# Patient Record
Sex: MALE | Race: BLACK OR AFRICAN AMERICAN | Employment: UNEMPLOYED | URBAN - METROPOLITAN AREA
[De-identification: names, ages, dates, MRNs, and addresses within clinical notes are randomized per-mention and may not be internally consistent; named-entity substitution may affect disease eponyms.]

---

## 2022-06-13 ENCOUNTER — APPOINTMENT (OUTPATIENT)
Dept: GENERAL RADIOLOGY | Age: 27
End: 2022-06-13

## 2022-06-13 ENCOUNTER — HOSPITAL ENCOUNTER (EMERGENCY)
Age: 27
Discharge: HOME | End: 2022-06-13

## 2022-06-13 VITALS
OXYGEN SATURATION: 95 % | HEART RATE: 68 BPM | HEIGHT: 74 IN | TEMPERATURE: 98.3 F | SYSTOLIC BLOOD PRESSURE: 114 MMHG | RESPIRATION RATE: 16 BRPM | WEIGHT: 183 LBS | DIASTOLIC BLOOD PRESSURE: 84 MMHG | BODY MASS INDEX: 23.49 KG/M2

## 2022-06-13 DIAGNOSIS — L60.8 TOENAIL DEFORMITY: ICD-10-CM

## 2022-06-13 DIAGNOSIS — M79.675 GREAT TOE PAIN, LEFT: Primary | ICD-10-CM

## 2022-06-13 PROCEDURE — 73660 X-RAY EXAM OF TOE(S): CPT

## 2022-06-13 PROCEDURE — 99283 EMERGENCY DEPT VISIT LOW MDM: CPT

## 2022-06-13 ASSESSMENT — PAIN SCALES - GENERAL: PAINLEVEL_OUTOF10: 6

## 2022-06-13 NOTE — ED NOTES
Discharge education reviewed. Questions answered. Medications clarified. Belongings gathered. Discharge instructions signed. All belongings accounted for. Patient discharged to home via POV.       Baljeet Littlejohn RN  06/13/22 9613

## 2022-06-13 NOTE — ED PROVIDER NOTES
eMERGENCY dEPARTMENT eNCOUnter        [address] [facility]    PCP: No primary care provider on file. CHIEF COMPLAINT    Chief Complaint   Patient presents with    Toe Pain     left big toe          HPI    Earlene Villafuerte is a 24 y.o. male who presents with pain great toe pain. Onset is acute on chronic. Context is patient states he initially injured the left great toe 12 years ago while [place]. He denies any new trauma or injury however does state intermittently he will have dirt and debris under the toenail which she will clean out. 10 days ago, he was trying to clean out the underside of the toe when he began developing bleeding under the nail plate. He states since then, he has had dried dark blood and discoloration to the great toenail but no active bleeding. He has had some pain along the lateral nail fold but no redness or warmth at this time. He is not a diabetic. No history of IV drug use. No fever or chills. Pain is 6/10 throbbing ache that worsens with palpation and ambulation. No other similar complaints other toes of the left foot. REVIEW OF SYSTEMS    General: No fever or chills  Skin: See HPI  Musculoskeletal: No other joint pain    All other review of systems are negative  See HPI and nursing notes for additional information       PAST MEDICAL & SURGICAL HISTORY    No past medical history on file. No past surgical history on file.     CURRENT MEDICATIONS        ALLERGIES    Not on File    SOCIAL & FAMILY HISTORY    Social History     Socioeconomic History    Marital status: Single     Spouse name: Not on file    Number of children: Not on file    Years of education: Not on file    Highest education level: Not on file   Occupational History    Not on file   Tobacco Use    Smoking status: Not on file    Smokeless tobacco: Not on file   Substance and Sexual Activity    Alcohol use: Not on file    Drug use: Not on file    Sexual activity: Not on file   Other Topics Concern    Not on file   Social History Narrative    Not on file     Social Determinants of Health     Financial Resource Strain:     Difficulty of Paying Living Expenses: Not on file   Food Insecurity:     Worried About Running Out of Food in the Last Year: Not on file    [place]ette of Food in the Last Year: Not on file   Transportation Needs:     Lack of Transportation (Medical): Not on file    Lack of Transportation (Non-Medical): Not on file   Physical Activity:     Days of Exercise per Week: Not on file    Minutes of Exercise per Session: Not on file   Stress:     Feeling of Stress : Not on file   Social Connections:     Frequency of Communication with Friends and Family: Not on file    Frequency of Social Gatherings with Friends and Family: Not on file    Attends Buddhist Services: Not on file    Active Member of [address] [place] [address] [employer] or Organizations: Not on file    Attends Club or Organization Meetings: Not on file    Marital Status: Not on file   Intimate Partner Violence:     Fear of Current or Ex-Partner: Not on file    Emotionally Abused: Not on file    Physically Abused: Not on file    Sexually Abused: Not on file   Housing Stability:     Unable to Pay for Housing in the Last Year: Not on file    Number of [place] in the Last Year: Not on file    Unstable Housing in the Last Year: Not on file     No family history on file. PHYSICAL EXAM    VITAL SIGNS: /84   Pulse 68   Temp 98.3 °F (36.8 °C) (Oral)   Resp 16   Ht 6' 2\" (1.88 m)   Wt 183 lb (83 kg)   SpO2 95%   BMI 23.50 kg/m²   Constitutional:  Well developed, well nourished. Appears comfortable  HENT:  Atraumatic, normocephalic, PERRL, EOMIs, nasal bones midline, trachea midline  Respiratory:  No retractions, no respiratory distress  Musculoskeletal:   Focused exam of the left great toe reveals no gross bony deformities.   Patient does have an abnormally shaped thickened left toenail with lifting of the distal nail plate with visible dried blood under the nail plate. No erythema swelling or fluctuance of the proximal nail fold or lateral nail folds. Pad of the toe is soft and nontender. Distal nail plate has been cut very short. No tenderness warmth or edema of the IP or MTP joints. No proximal streaking onto the foot dorsum. The ankle joint is stable with intact active ROM. Achilles tendon is clinical intact. Compartments are soft throughout the left lower extremity. No swelling, discoloration, or tenderness to palpation of the proximal to distal lower leg bones, ankle & other toes  Distal capillary refill, sensation, motor intact. Vascular:  DP pulse 2+, capillary refill intact. Integument:  No open wounds of the affected limb  Neurologic:  Awake alert, no slurred speech. No foot drop of the affected extremity. DTRs and distal sensation equal/intact. RADIOLOGY           XR TOE LEFT (MIN 2 VIEWS) (Final result)  Result time 06/13/22 15:52:25  Final result by Cedric Ba MD (06/13/22 15:52:25)                Impression:    No acute or subacute abnormality identified within the left great toe. If symptoms persist, consider further evaluation with nonemergent MRI. Narrative:    EXAMINATION:   3 XRAY VIEWS OF THE LEFT TOE     6/13/2022 12:37 pm     COMPARISON:   None.      HISTORY:   ORDERING SYSTEM PROVIDED HISTORY: left great toe pain   TECHNOLOGIST PROVIDED HISTORY:   Reason for exam:->left great toe pain   Reason for Exam: hurt 1st toe while playing basket ball x10 days ago     FINDINGS:   3 images of the left great toe were obtained.  No acute fracture or   dislocation is identified within the left great toe.  There is suggestion of   mild hallux valgus metatarsus varus primus on these nonweightbearing images.                           ED COURSE & MEDICAL DECISION MAKING       Vital signs and nursing notes reviewed during ED course. I have independently evaluated this patient . Supervising MD - Dr. Jaleesa Torres - present in the Emergency Department, available for consultation, throughout entirety of  patient care. All pertinent Lab data and radiographic results reviewed with patient at bedside. The patient and/or the family were informed of the results of any tests/labs/imaging, the treatment plan, and time was allotted to answer questions. Differential diagnosis includes but not limited to fracture, dislocation, vascular injury, neurologic injury. Clinical  IMPRESSION    1. Great toe pain, left    2. Toenail deformity          Patient presents with left great toe pain, acute on chronic. Work-up was initiated triage. I evaluated patient initially after a 3+ hour wait time in the ED waiting room. On my exam, patient is resting comfortably, nontoxic. No gross bony deformities of the left toe. Patient does have an abnormally shaped bend and cut short nail plate with visible lifting of the distal nail plate off the nailbed and dried blood/subungual hematoma of the nail plate. There is no erythema induration or fluctuance of the nail folds concerning for paronychia. Pad of the toe is soft nontender. No streaking proximally and patient otherwise has full range of motion without joint tenderness of first toe IP or MTP joints. X-ray of left great toe shows no evidence of acute osseous abnormality including fracture or distal tuft abnormalities. There is suggestive of mild hallux valgus deformity. At this time, no evidence of paronychia or great toe infection. No significant curvature of the distal nail plate concerning for ingrown nail requiring partial nail plate removal at this time. Underlying subungual hematoma does appear old with dried blood and does not require trephination for evacuation. We discussed symptomatic care and outpatient follow-up with local podiatry.   May require complete nail removal on an outpatient basis but no evidence of infectious process requiring antibiotics, labs or additional imaging at this time. Low clinical suspicion for ischemic limb, compartment syndrome, intra-articular joint infection/septic arthritis, DVT, osteomyelitis, arterial/neurologic injury, necrotizing fasciitis, or infected/rapidly expanding hematoma. Patient is discharged in stable condition. Educated on [address] therapy. Educated to avoid further cutting or cleaning of the nail plate. Encourage Motrin/Tylenol for pain, Epson salts soaks. Patient is instructed to followup with podiatry in 3-5 days, sooner with worsened symptoms. Return precautions back to the ED discussed for any new or worsening symptoms. Diagnosis and plan discussed in detail with patient who understands and agrees. Patient agrees to return emergency department if symptoms worsen or any new symptoms develop. Comment: Please note this report has been produced using speech recognition software and may contain errors related to that system including errors in grammar, punctuation, and spelling, as well as words and phrases that may be inappropriate. If there are any questions or concerns please feel free to contact the dictating provider for clarification.             Ethel Clay PA-C  06/13/22 0882